# Patient Record
(demographics unavailable — no encounter records)

---

## 2024-10-09 NOTE — HISTORY OF PRESENT ILLNESS
[FreeTextEntry1] : Language: *** Date of First visit: 10/08/2024 Accompanied by: *** Contact info: *** Referring Provider/PCP: Dr. Linder  [phone]      CC/ Problem List:   =============================================================================== FIRST VISIT: The patient has been a private practice patient of Dr. Sonny Duque Prior paper chart WAS available for this visit. Any Prior paper chart is scanned into the [employer] system. Please see admin   The patient's first visit with [facility] urology was on 10/08/2024 when the patient was 60 years old The patient was first seen in 2017 for interrupted urine stream starting 2017. The pt has been seen every 6 mos for observation.   ------------------------------------------------------------------------------------------- INTERVAL VISITS:   The patient's age today 10/08/2024  is 60 year old. Please note interval events and changes in PMH, PSH, MEDS and ALLERGIES were reviewed. He has no trouble urinating. He occasionally has an interrupted stream. He denies needing to double void. He feels his FOS is normal. He denies straining to void, hematuria, dysuria. He has nocturia 1-3x/night depending on fluid/EtOH intake. He is not bothered by his urinating. He does drink a lot during the day. He has no h/o  trauma or surgery. He has never had a UTI or kidney stone.  He does not drink coffee. He will have soda or energy drinks.  He has no FH of prostate issues   ===============================================================================   PMH: HLD, Anxiety PSH: Knee arthroscopy, Rotator cuff, Dislocated shoulder POBH: (if applicable) FH: NO FH of prostate issues   ALL:  NKDA MEDS: Klonopin, Lamotrigine, Rosuvastatin, Vit D, ASA 81mg SOC: No Tob, Drugs, occasional gummy, social EtOH     ROS: Review of Systems is as per HPI unless otherwise denoted below     =============================================================================== DATA:   LABS:------------------------------------------------------------------------------------------------------------------- 10/12/2018: PSA 2.2, UA negative 0-2 RBC 2019: PSA 2.3 2020: PSA 1.7 10/13/2021: PSA 2.47, UA negative 0 RBC, 2022: PSA 2.02      RADS:-------------------------------------------------------------------------------------------------------------------       PATHOLOGY/CYTOLOGY:-------------------------------------------------------------------------------------------       VOIDING STUDIES: ---------------------------------------------------------------------------------------------------- 10/8/2024: PVR 29cc     STONE STUDIES: (Analysis/LLSA)----------------------------------------------------------------------------------       PROCEDURES: -----------------------------------------------------------------------------------------------         ===============================================================================    PHYSICAL EXAM:  GEN: AAOx3, NAD; Habitus: normal  BARRIERS to CARE: none  PSYCH: Appropriate Behavior, Affect Congruent  HEENT: AT/NC Trachea midline. EOMI.  Lungs: No labored breathing.  NEURO: + Movement, all 4 extremities grossly intact without deficits. No tremors.  SKIN: Warm dry. No visible rashes or ulcers  GAIT: Gait normal, Stability good   =======================================================================================    ASSESSMENT and PLAN   The patient is a 60 year male with a history of the followin. Mild urinary frequency at night when he drinks ETOH, mild LUTS, Low PVR We discussed the pathophysiology of ETOH consumption and frequency and its change over time (with age).  He understood. He is not bothered by his symptoms at this point and does not need treatment.  2. Prostate cancer screening We do not have any PSA's since . We will try to get these from Dr. Linder. We also discussed at length the S/S of LISSY vs PSA vs MRI. He understood  I offered him his records from Dr. Duque and he did not want them.  All records were transcribed into this note   -----------------------------------------------------------------------------------------------------   LABS/TESTS Ordered: get PSA's from Dr. Linder   Meds Ordered:   Follow up: 1 year   -----------------------------------------------------------------------------------------------------    The total amount of time I have personally spent preparing for this visit, reviewing the patient's test results, obtaining external history, ordering tests/medications, documenting clinical information, communicating with and counseling the patient/family and/or caregiver(s), and spent face to face with the patient explaining the above was  45 minutes.   Thank you for allowing me to participate in your patient's care. Please feel free to contact me with any questions.   Bernie Desai MD [facility] Department of Urology   [address] [place] [id number] P: [phone]; F: [phone]

## 2024-10-09 NOTE — HISTORY OF PRESENT ILLNESS
[FreeTextEntry1] : Language: *** Date of First visit: 10/08/2024 Accompanied by: *** Contact info: *** Referring Provider/PCP: Dr. Linder  [phone]      CC/ Problem List:   =============================================================================== FIRST VISIT: The patient has been a private practice patient of Dr. Sonny Duque Prior paper chart WAS available for this visit. Any Prior paper chart is scanned into the [employer] system. Please see admin   The patient's first visit with [facility] urology was on 10/08/2024 when the patient was 60 years old The patient was first seen in 2017 for interrupted urine stream starting 2017. The pt has been seen every 6 mos for observation.   ------------------------------------------------------------------------------------------- INTERVAL VISITS:   The patient's age today 10/08/2024  is 60 year old. Please note interval events and changes in PMH, PSH, MEDS and ALLERGIES were reviewed. He has no trouble urinating. He occasionally has an interrupted stream. He denies needing to double void. He feels his FOS is normal. He denies straining to void, hematuria, dysuria. He has nocturia 1-3x/night depending on fluid/EtOH intake. He is not bothered by his urinating. He does drink a lot during the day. He has no h/o  trauma or surgery. He has never had a UTI or kidney stone.  He does not drink coffee. He will have soda or energy drinks.  He has no FH of prostate issues   ===============================================================================   PMH: HLD, Anxiety PSH: Knee arthroscopy, Rotator cuff, Dislocated shoulder POBH: (if applicable) FH: NO FH of prostate issues   ALL:  NKDA MEDS: Klonopin, Lamotrigine, Rosuvastatin, Vit D, ASA 81mg SOC: No Tob, Drugs, occasional gummy, social EtOH     ROS: Review of Systems is as per HPI unless otherwise denoted below     =============================================================================== DATA:   LABS:------------------------------------------------------------------------------------------------------------------- 10/12/2018: PSA 2.2, UA negative 0-2 RBC 2019: PSA 2.3 2020: PSA 1.7 10/13/2021: PSA 2.47, UA negative 0 RBC, 2022: PSA 2.02      RADS:-------------------------------------------------------------------------------------------------------------------       PATHOLOGY/CYTOLOGY:-------------------------------------------------------------------------------------------       VOIDING STUDIES: ---------------------------------------------------------------------------------------------------- 10/8/2024: PVR 29cc     STONE STUDIES: (Analysis/LLSA)----------------------------------------------------------------------------------       PROCEDURES: -----------------------------------------------------------------------------------------------         ===============================================================================    PHYSICAL EXAM:  GEN: AAOx3, NAD; Habitus: normal  BARRIERS to CARE: none  PSYCH: Appropriate Behavior, Affect Congruent  HEENT: AT/NC Trachea midline. EOMI.  Lungs: No labored breathing.  NEURO: + Movement, all 4 extremities grossly intact without deficits. No tremors.  SKIN: Warm dry. No visible rashes or ulcers  GAIT: Gait normal, Stability good   =======================================================================================    ASSESSMENT and PLAN   The patient is a 60 year male with a history of the followin. Mild urinary frequency at night when he drinks ETOH, mild LUTS, Low PVR We discussed the pathophysiology of ETOH consumption and frequency and its change over time (with age).  He understood. He is not bothered by his symptoms at this point and does not need treatment.  2. Prostate cancer screening We do not have any PSA's since . We will try to get these from Dr. Linder. We also discussed at length the S/S of LISSY vs PSA vs MRI. He understood  I offered him his records from Dr. Duque and he did not want them.  All records were transcribed into this note   -----------------------------------------------------------------------------------------------------   LABS/TESTS Ordered: get PSA's from Dr. Linder   Meds Ordered:   Follow up: 1 year   -----------------------------------------------------------------------------------------------------    The total amount of time I have personally spent preparing for this visit, reviewing the patient's test results, obtaining external history, ordering tests/medications, documenting clinical information, communicating with and counseling the patient/family and/or caregiver(s), and spent face to face with the patient explaining the above was  45 minutes.   Thank you for allowing me to participate in your patient's care. Please feel free to contact me with any questions.   Bernie Desai MD [facility] Department of Urology   [address] [place] [id number] P: [phone]; F: [phone]